# Patient Record
Sex: MALE | Race: WHITE | Employment: OTHER | URBAN - METROPOLITAN AREA
[De-identification: names, ages, dates, MRNs, and addresses within clinical notes are randomized per-mention and may not be internally consistent; named-entity substitution may affect disease eponyms.]

---

## 2021-09-19 ENCOUNTER — HOSPITAL ENCOUNTER (EMERGENCY)
Facility: CLINIC | Age: 41
Discharge: HOME | End: 2021-09-19
Attending: EMERGENCY MEDICINE | Admitting: EMERGENCY MEDICINE
Payer: COMMERCIAL

## 2021-09-19 VITALS
RESPIRATION RATE: 18 BRPM | BODY MASS INDEX: 21.2 KG/M2 | SYSTOLIC BLOOD PRESSURE: 114 MMHG | DIASTOLIC BLOOD PRESSURE: 82 MMHG | HEIGHT: 73 IN | HEART RATE: 73 BPM | WEIGHT: 160 LBS | OXYGEN SATURATION: 100 % | TEMPERATURE: 97.1 F

## 2021-09-19 DIAGNOSIS — M54.50 ACUTE BILATERAL LOW BACK PAIN WITHOUT SCIATICA: ICD-10-CM

## 2021-09-19 PROCEDURE — 250N000013 HC RX MED GY IP 250 OP 250 PS 637: Performed by: EMERGENCY MEDICINE

## 2021-09-19 PROCEDURE — 99283 EMERGENCY DEPT VISIT LOW MDM: CPT

## 2021-09-19 RX ORDER — CYCLOBENZAPRINE HCL 10 MG
10 TABLET ORAL ONCE
Status: COMPLETED | OUTPATIENT
Start: 2021-09-19 | End: 2021-09-19

## 2021-09-19 RX ORDER — IBUPROFEN 800 MG/1
800 TABLET, FILM COATED ORAL ONCE
Status: COMPLETED | OUTPATIENT
Start: 2021-09-19 | End: 2021-09-19

## 2021-09-19 RX ORDER — CYCLOBENZAPRINE HCL 10 MG
10 TABLET ORAL 3 TIMES DAILY PRN
Qty: 30 TABLET | Refills: 0 | Status: SHIPPED | OUTPATIENT
Start: 2021-09-19

## 2021-09-19 RX ADMIN — IBUPROFEN 800 MG: 800 TABLET, FILM COATED ORAL at 17:39

## 2021-09-19 RX ADMIN — CYCLOBENZAPRINE HYDROCHLORIDE 10 MG: 10 TABLET, FILM COATED ORAL at 20:09

## 2021-09-19 ASSESSMENT — ENCOUNTER SYMPTOMS: BACK PAIN: 1

## 2021-09-19 ASSESSMENT — MIFFLIN-ST. JEOR: SCORE: 1674.64

## 2021-09-19 NOTE — ED TRIAGE NOTES
"Pt c/o \"joint problems\", states his right hip has been bothering him and \"threw his back out\". Pt reports \"pinching pain\" states it feels like something is \"biting into his spinal cord\". Denies injury. Has been using lidocaine patches with very little relief of pain.   "

## 2021-09-20 NOTE — ED PROVIDER NOTES
"  History   Chief Complaint:  Back Pain       The history is provided by the patient.      Fredy Bearden is a 41 year old male who presents with left lower back pain. He mentions chronic hip and left low back pain that has progressed over the past two months.  Denies fall or trauma.  Pain is greatest in the left low back.  He also notes right hip pain that is occasional stabbing pain and has pain with flexion and external rotation of the hip.  Denies recent falls or injuries. He describes his pain as dull. He tired a lidocaine patch, ibuprofen, heating pad, and hot baths all without relief. He was referred to orthopedics by his primary care but has not had appointment.  He states had xrays in the past but several years ago.  Denies fevers.  Denies numbness tingling or weakness of ext.  No drug use.    Review of Systems   Constitutional:        Denies recent falls or injuries.   Musculoskeletal: Positive for back pain.   All other systems reviewed and are negative.        Allergies:  No Known Drug Allergies    Medications:  citalopram   lamotrigine   lisdexamfetamine   Lorazepam   Omeprazole   Ranitidine     Past Medical History:    Depressive disorder   Gastroesophageal reflux disease   Anxiety states   Tobacco abuse   Schizoaffective disorder    Past Surgical History:    [place] teeth extraction   Myringotomy with tube placement    Family History:    Father: hypertension, diabetes mellitus  Sister: depression, migraines, Thyroid disease   Mother: migraines     Social History:  Presents alone.  He does not drive.     Physical Exam     Patient Vitals for the past 24 hrs:   BP Temp Temp src Pulse Resp SpO2 Height Weight   09/19/21 2045 -- -- -- -- -- 100 % -- --   09/19/21 2030 -- -- -- -- -- 98 % -- --   09/19/21 2015 -- -- -- -- -- 98 % -- --   09/19/21 2002 114/82 -- -- 73 18 100 % -- --   09/19/21 1735 (!) 141/89 97.1  F (36.2  C) Temporal 94 20 99 % 1.854 m (6' 1\") 72.6 kg (160 lb)       Physical Exam   Gen: " alert  CV: 2+ DP and PT pulses  Abdomen: soft, nontender  Back: no midline tenderness, left paraspinous muscle tenderness  MSK: lower extremities with full AROM at hip, knee and ankle  Neuro: 5/5 strength BLE in hip flexion and ext, knee flexion and ext, plantar and dorsiflexion, and extension of EHL, sensation intact to light touch over all dermatomes of the legs  Skin: skin over torso normal    Emergency Department Course     Emergency Department Course:    Reviewed:  I reviewed nursing notes, vitals, past medical history and care everywhere    Assessments:  2040 I obtained history and examined the patient as noted above.     Interventions:  1739 Advil 800mg Oral   2009 Flexeril 10mg Oral     Disposition:  The patient was discharged to home.     Impression & Plan         Medical Decision Making:    The patient presents for back pain.  Based on hx and exam, this is consistent with musculoskeletal back pain.  There are no red flag symptoms to suggest acute neurologic emergency, acute spinal cord or nerve root compression or cauda equina syndrome.  I do not suspect referred pain from an intraabdominal or urologic process.  The patient was provided with pain control.   Referral left on [facility] follow up line to facilitate orthopedic specialty care.  Discussed may need MRI of back and hip but no indication for this in the ED.      Pain plan:   Take ibuprofen 600 mg 3x per day.  This will provide both pain control and fight against inflammation.   Take tylenol 1000mg 4x per day.  Do not take more than 4000mg tylenol in 24 hours.  You were given a prescription for flexeril.  This is a muscle relaxant medication.  Use this as needed for additional pain control.    Diagnosis:    ICD-10-CM    1. Acute bilateral low back pain without sciatica  M54.5        Discharge Medications:  New Prescriptions    CYCLOBENZAPRINE (FLEXERIL) 10 MG TABLET    Take 1 tablet (10 mg) by mouth 3 times daily as needed for muscle spasms       Scribe  Disclosure:  I, Camila Chin, am serving as a scribe at 8:38 PM on 9/19/2021 to document services personally performed by Sharonda Perez MD based on my observations and the provider's statements to me.          Sharonda Perez MD  09/19/21 2111

## 2021-09-20 NOTE — ED NOTES
"Pt comes to ED after c/o bilat back & hip pain. Pt states he had had \"joint problems for years\", but never followed up with a specialist. Pt states his daughter also has a \"syndrome but she's too young\" to be formally diagnosed. Pt still able to ambulate and bear weight, denies N&T.   "
